# Patient Record
Sex: MALE | Race: WHITE | NOT HISPANIC OR LATINO | Employment: OTHER | URBAN - METROPOLITAN AREA
[De-identification: names, ages, dates, MRNs, and addresses within clinical notes are randomized per-mention and may not be internally consistent; named-entity substitution may affect disease eponyms.]

---

## 2023-01-01 ENCOUNTER — TELEPHONE (OUTPATIENT)
Dept: PRIMARY CARE | Facility: CLINIC | Age: 73
End: 2023-01-01
Payer: MEDICARE

## 2023-01-01 ENCOUNTER — PATIENT OUTREACH (OUTPATIENT)
Dept: CARE COORDINATION | Facility: CLINIC | Age: 73
End: 2023-01-01
Payer: MEDICARE

## 2023-01-01 ENCOUNTER — APPOINTMENT (OUTPATIENT)
Dept: RADIOLOGY | Facility: HOSPITAL | Age: 73
End: 2023-01-01
Payer: MEDICARE

## 2023-01-01 ENCOUNTER — HOSPITAL ENCOUNTER (EMERGENCY)
Facility: HOSPITAL | Age: 73
Discharge: HOME | End: 2023-10-31
Attending: STUDENT IN AN ORGANIZED HEALTH CARE EDUCATION/TRAINING PROGRAM
Payer: MEDICARE

## 2023-01-01 ENCOUNTER — TELEPHONE (OUTPATIENT)
Dept: PRIMARY CARE | Facility: CLINIC | Age: 73
End: 2023-01-01

## 2023-01-01 ENCOUNTER — HOSPITAL ENCOUNTER (EMERGENCY)
Facility: HOSPITAL | Age: 73
End: 2023-11-01
Attending: STUDENT IN AN ORGANIZED HEALTH CARE EDUCATION/TRAINING PROGRAM
Payer: MEDICARE

## 2023-01-01 ENCOUNTER — HOSPITAL ENCOUNTER (EMERGENCY)
Facility: HOSPITAL | Age: 73
Discharge: HOME | End: 2023-10-06
Attending: STUDENT IN AN ORGANIZED HEALTH CARE EDUCATION/TRAINING PROGRAM
Payer: MEDICARE

## 2023-01-01 VITALS
HEIGHT: 72 IN | HEART RATE: 117 BPM | TEMPERATURE: 97.2 F | WEIGHT: 315 LBS | OXYGEN SATURATION: 95 % | DIASTOLIC BLOOD PRESSURE: 67 MMHG | SYSTOLIC BLOOD PRESSURE: 154 MMHG | BODY MASS INDEX: 42.66 KG/M2 | RESPIRATION RATE: 24 BRPM

## 2023-01-01 VITALS
HEART RATE: 73 BPM | TEMPERATURE: 96.8 F | WEIGHT: 315 LBS | RESPIRATION RATE: 18 BRPM | DIASTOLIC BLOOD PRESSURE: 59 MMHG | SYSTOLIC BLOOD PRESSURE: 117 MMHG | HEIGHT: 77 IN | BODY MASS INDEX: 37.19 KG/M2 | OXYGEN SATURATION: 98 %

## 2023-01-01 VITALS
WEIGHT: 315 LBS | HEIGHT: 72 IN | BODY MASS INDEX: 42.66 KG/M2 | SYSTOLIC BLOOD PRESSURE: 109 MMHG | DIASTOLIC BLOOD PRESSURE: 80 MMHG | HEART RATE: 119 BPM

## 2023-01-01 DIAGNOSIS — I10 HYPERTENSION, BENIGN: ICD-10-CM

## 2023-01-01 DIAGNOSIS — E13.9 CONTROLLED SECONDARY DIABETES MELLITUS (MULTI): Primary | ICD-10-CM

## 2023-01-01 DIAGNOSIS — B35.6 TINEA CRURIS: Primary | ICD-10-CM

## 2023-01-01 DIAGNOSIS — N30.00 ACUTE CYSTITIS WITHOUT HEMATURIA: Primary | ICD-10-CM

## 2023-01-01 DIAGNOSIS — B96.89 ACUTE BACTERIAL SINUSITIS: Primary | ICD-10-CM

## 2023-01-01 DIAGNOSIS — E03.9 HYPOTHYROIDISM, UNSPECIFIED TYPE: Primary | ICD-10-CM

## 2023-01-01 DIAGNOSIS — B35.6 TINEA CRURIS: ICD-10-CM

## 2023-01-01 DIAGNOSIS — K30 DELAYED GASTRIC TRANSIT: Primary | ICD-10-CM

## 2023-01-01 DIAGNOSIS — I10 HYPERTENSION, BENIGN: Primary | ICD-10-CM

## 2023-01-01 DIAGNOSIS — N39.0 ACUTE UTI: Primary | ICD-10-CM

## 2023-01-01 DIAGNOSIS — I46.9: Primary | ICD-10-CM

## 2023-01-01 DIAGNOSIS — J01.90 ACUTE BACTERIAL SINUSITIS: Primary | ICD-10-CM

## 2023-01-01 LAB
ABO GROUP (TYPE) IN BLOOD: NORMAL
ALANINE AMINOTRANSFERASE (SGPT) (U/L) IN SER/PLAS: 17 U/L (ref 10–52)
ALANINE AMINOTRANSFERASE (SGPT) (U/L) IN SER/PLAS: NORMAL
ALBUMIN (G/DL) IN SER/PLAS: 3.3 G/DL (ref 3.4–5)
ALBUMIN (G/DL) IN SER/PLAS: NORMAL
ALBUMIN (MG/L) IN URINE: <7 MG/L
ALBUMIN (MG/L) IN URINE: NORMAL
ALBUMIN SERPL BCP-MCNC: 3.5 G/DL (ref 3.4–5)
ALBUMIN SERPL BCP-MCNC: 3.6 G/DL (ref 3.4–5)
ALBUMIN/CREATININE (UG/MG) IN URINE: NORMAL
ALBUMIN/CREATININE (UG/MG) IN URINE: NORMAL UG/MG CRT (ref 0–30)
ALKALINE PHOSPHATASE (U/L) IN SER/PLAS: 53 U/L (ref 33–136)
ALKALINE PHOSPHATASE (U/L) IN SER/PLAS: NORMAL
ALP SERPL-CCNC: 50 U/L (ref 33–136)
ALP SERPL-CCNC: 64 U/L (ref 33–136)
ALT SERPL W P-5'-P-CCNC: 14 U/L (ref 10–52)
ALT SERPL W P-5'-P-CCNC: 17 U/L (ref 10–52)
ANION GAP IN SER/PLAS: 8 MMOL/L (ref 10–20)
ANION GAP IN SER/PLAS: NORMAL
ANION GAP SERPL CALC-SCNC: 18 MMOL/L (ref 10–20)
ANION GAP SERPL CALC-SCNC: 9 MMOL/L (ref 10–20)
ANTIBODY SCREEN: NORMAL
APPEARANCE UR: ABNORMAL
ASPARTATE AMINOTRANSFERASE (SGOT) (U/L) IN SER/PLAS: 20 U/L (ref 9–39)
ASPARTATE AMINOTRANSFERASE (SGOT) (U/L) IN SER/PLAS: NORMAL
AST SERPL W P-5'-P-CCNC: 15 U/L (ref 9–39)
AST SERPL W P-5'-P-CCNC: 24 U/L (ref 9–39)
BACTERIA #/AREA URNS AUTO: ABNORMAL /HPF
BASOPHILS # BLD AUTO: 0.02 X10*3/UL (ref 0–0.1)
BASOPHILS # BLD AUTO: 0.04 X10*3/UL (ref 0–0.1)
BASOPHILS NFR BLD AUTO: 0.2 %
BASOPHILS NFR BLD AUTO: 0.5 %
BILIRUB SERPL-MCNC: 0.4 MG/DL (ref 0–1.2)
BILIRUB SERPL-MCNC: 0.5 MG/DL (ref 0–1.2)
BILIRUB UR STRIP.AUTO-MCNC: NEGATIVE MG/DL
BILIRUBIN TOTAL (MG/DL) IN SER/PLAS: 0.3 MG/DL (ref 0–1.2)
BILIRUBIN TOTAL (MG/DL) IN SER/PLAS: NORMAL
BUN SERPL-MCNC: 27 MG/DL (ref 6–23)
BUN SERPL-MCNC: 27 MG/DL (ref 6–23)
CALCIDIOL (25 OH VITAMIN D3) (NG/ML) IN SER/PLAS: 41 NG/ML
CALCIDIOL (25 OH VITAMIN D3) (NG/ML) IN SER/PLAS: NORMAL
CALCIUM (MG/DL) IN SER/PLAS: 9 MG/DL (ref 8.6–10.3)
CALCIUM (MG/DL) IN SER/PLAS: NORMAL
CALCIUM SERPL-MCNC: 9.2 MG/DL (ref 8.6–10.3)
CALCIUM SERPL-MCNC: 9.4 MG/DL (ref 8.6–10.3)
CARBON DIOXIDE, TOTAL (MMOL/L) IN SER/PLAS: 35 MMOL/L (ref 21–32)
CARBON DIOXIDE, TOTAL (MMOL/L) IN SER/PLAS: NORMAL
CHLORIDE (MMOL/L) IN SER/PLAS: 101 MMOL/L (ref 98–107)
CHLORIDE (MMOL/L) IN SER/PLAS: NORMAL
CHLORIDE SERPL-SCNC: 101 MMOL/L (ref 98–107)
CHLORIDE SERPL-SCNC: 96 MMOL/L (ref 98–107)
CHOLESTEROL (MG/DL) IN SER/PLAS: 112 MG/DL (ref 0–199)
CHOLESTEROL (MG/DL) IN SER/PLAS: NORMAL
CHOLESTEROL IN HDL (MG/DL) IN SER/PLAS: 29.5 MG/DL
CHOLESTEROL IN HDL (MG/DL) IN SER/PLAS: NORMAL
CHOLESTEROL IN LDL (MG/DL) IN SER/PLAS BY DIRECT ASSAY: 65 MG/DL (ref 0–129)
CHOLESTEROL/HDL RATIO: 3.8
CHOLESTEROL/HDL RATIO: NORMAL
CO2 SERPL-SCNC: 30 MMOL/L (ref 21–32)
CO2 SERPL-SCNC: 35 MMOL/L (ref 21–32)
COLOR UR: ABNORMAL
CREAT SERPL-MCNC: 0.98 MG/DL (ref 0.5–1.3)
CREAT SERPL-MCNC: 1 MG/DL (ref 0.5–1.3)
CREATININE (MG/DL) IN SER/PLAS: 0.93 MG/DL (ref 0.5–1.3)
CREATININE (MG/DL) IN SER/PLAS: NORMAL
CREATININE (MG/DL) IN URINE: 91.1 MG/DL (ref 20–370)
CREATININE (MG/DL) IN URINE: NORMAL
EOSINOPHIL # BLD AUTO: 0 X10*3/UL (ref 0–0.4)
EOSINOPHIL # BLD AUTO: 0.14 X10*3/UL (ref 0–0.4)
EOSINOPHIL NFR BLD AUTO: 0 %
EOSINOPHIL NFR BLD AUTO: 1.9 %
ERYTHROCYTE [DISTWIDTH] IN BLOOD BY AUTOMATED COUNT: 13.9 % (ref 11.5–14.5)
ERYTHROCYTE [DISTWIDTH] IN BLOOD BY AUTOMATED COUNT: 14.6 % (ref 11.5–14.5)
ESTIMATED AVERAGE GLUCOSE FOR HBA1C: 148 MG/DL
ESTIMATED AVERAGE GLUCOSE FOR HBA1C: NORMAL
GFR FEMALE: NORMAL
GFR MALE: 88 ML/MIN/1.73M2
GFR MALE: NORMAL
GFR SERPL CREATININE-BSD FRML MDRD: 80 ML/MIN/1.73M*2
GFR SERPL CREATININE-BSD FRML MDRD: 82 ML/MIN/1.73M*2
GLUCOSE (MG/DL) IN SER/PLAS: 162 MG/DL (ref 74–99)
GLUCOSE (MG/DL) IN SER/PLAS: NORMAL
GLUCOSE BLD MANUAL STRIP-MCNC: 326 MG/DL (ref 74–99)
GLUCOSE SERPL-MCNC: 353 MG/DL (ref 74–99)
GLUCOSE SERPL-MCNC: 78 MG/DL (ref 74–99)
GLUCOSE UR STRIP.AUTO-MCNC: NEGATIVE MG/DL
HCT VFR BLD AUTO: 40.7 % (ref 41–52)
HCT VFR BLD AUTO: 42.7 % (ref 41–52)
HEMOGLOBIN A1C/HEMOGLOBIN TOTAL IN BLOOD: 6.8 %
HEMOGLOBIN A1C/HEMOGLOBIN TOTAL IN BLOOD: NORMAL
HGB A1C-DATA CONVERSION: NORMAL %
HGB BLD-MCNC: 12.4 G/DL (ref 13.5–17.5)
HGB BLD-MCNC: 13 G/DL (ref 13.5–17.5)
IMM GRANULOCYTES # BLD AUTO: 0.04 X10*3/UL (ref 0–0.5)
IMM GRANULOCYTES # BLD AUTO: 0.07 X10*3/UL (ref 0–0.5)
IMM GRANULOCYTES NFR BLD AUTO: 0.4 % (ref 0–0.9)
IMM GRANULOCYTES NFR BLD AUTO: 0.9 % (ref 0–0.9)
INR PPP: 1.4 (ref 0.9–1.1)
KETONES UR STRIP.AUTO-MCNC: NEGATIVE MG/DL
LDL: 53 MG/DL (ref 0–99)
LDL: NORMAL
LEUKOCYTE ESTERASE UR QL STRIP.AUTO: ABNORMAL
LYMPHOCYTES # BLD AUTO: 0.53 X10*3/UL (ref 0.8–3)
LYMPHOCYTES # BLD AUTO: 1.69 X10*3/UL (ref 0.8–3)
LYMPHOCYTES NFR BLD AUTO: 22.5 %
LYMPHOCYTES NFR BLD AUTO: 5.4 %
MCH RBC QN AUTO: 26.8 PG (ref 26–34)
MCH RBC QN AUTO: 27 PG (ref 26–34)
MCHC RBC AUTO-ENTMCNC: 30.4 G/DL (ref 32–36)
MCHC RBC AUTO-ENTMCNC: 30.5 G/DL (ref 32–36)
MCV RBC AUTO: 88 FL (ref 80–100)
MCV RBC AUTO: 89 FL (ref 80–100)
MONOCYTES # BLD AUTO: 0.43 X10*3/UL (ref 0.05–0.8)
MONOCYTES # BLD AUTO: 0.57 X10*3/UL (ref 0.05–0.8)
MONOCYTES NFR BLD AUTO: 5.7 %
MONOCYTES NFR BLD AUTO: 5.8 %
MUCOUS THREADS #/AREA URNS AUTO: ABNORMAL /LPF
NEUTROPHILS # BLD AUTO: 5.14 X10*3/UL (ref 1.6–5.5)
NEUTROPHILS # BLD AUTO: 8.59 X10*3/UL (ref 1.6–5.5)
NEUTROPHILS NFR BLD AUTO: 68.5 %
NEUTROPHILS NFR BLD AUTO: 88.2 %
NITRITE UR QL STRIP.AUTO: POSITIVE
NON HDL CHOLESTEROL: NORMAL
NRBC BLD-RTO: 0 /100 WBCS (ref 0–0)
NRBC BLD-RTO: 0 /100 WBCS (ref 0–0)
PH UR STRIP.AUTO: 5 [PH]
PLATELET # BLD AUTO: 214 X10*3/UL (ref 150–450)
PLATELET # BLD AUTO: 257 X10*3/UL (ref 150–450)
PMV BLD AUTO: 10.1 FL (ref 7.5–11.5)
PMV BLD AUTO: 9.9 FL (ref 7.5–11.5)
POTASSIUM (MMOL/L) IN SER/PLAS: 3.9 MMOL/L (ref 3.5–5.3)
POTASSIUM (MMOL/L) IN SER/PLAS: NORMAL
POTASSIUM SERPL-SCNC: 4.3 MMOL/L (ref 3.5–5.3)
POTASSIUM SERPL-SCNC: 4.6 MMOL/L (ref 3.5–5.3)
PROT SERPL-MCNC: 6.1 G/DL (ref 6.4–8.2)
PROT SERPL-MCNC: 6.4 G/DL (ref 6.4–8.2)
PROT UR STRIP.AUTO-MCNC: NEGATIVE MG/DL
PROTEIN TOTAL: 5.7 G/DL (ref 6.4–8.2)
PROTEIN TOTAL: NORMAL
PROTHROMBIN TIME: 15.3 SECONDS (ref 9.8–12.8)
RBC # BLD AUTO: 4.62 X10*6/UL (ref 4.5–5.9)
RBC # BLD AUTO: 4.81 X10*6/UL (ref 4.5–5.9)
RBC # UR STRIP.AUTO: NEGATIVE /UL
RBC #/AREA URNS AUTO: ABNORMAL /HPF
RH FACTOR (ANTIGEN D): NORMAL
SODIUM (MMOL/L) IN SER/PLAS: 140 MMOL/L (ref 136–145)
SODIUM (MMOL/L) IN SER/PLAS: NORMAL
SODIUM SERPL-SCNC: 139 MMOL/L (ref 136–145)
SODIUM SERPL-SCNC: 141 MMOL/L (ref 136–145)
SP GR UR STRIP.AUTO: 1.02
T4,FREE BY EQUILIBRIUM DIALYSIS: NORMAL
THYROTROPIN (MIU/L) IN SER/PLAS BY DETECTION LIMIT <= 0.05 MIU/L: 4.29 MIU/L (ref 0.44–3.98)
THYROTROPIN (MIU/L) IN SER/PLAS BY DETECTION LIMIT <= 0.05 MIU/L: NORMAL
THYROXINE (T4) FREE (NG/DL) IN SER/PLAS: 0.79 NG/DL (ref 0.61–1.12)
TRIGLYCERIDE (MG/DL) IN SER/PLAS: 148 MG/DL (ref 0–149)
TRIGLYCERIDE (MG/DL) IN SER/PLAS: NORMAL
TRIIODOTHYRONINE (T3) FREE (PG/ML) IN SER/PLAS: 2.8 PG/ML (ref 2.3–4.2)
TRIIODOTHYRONINE (T3) FREE (PG/ML) IN SER/PLAS: NORMAL
UREA NITROGEN (MG/DL) IN SER/PLAS: 23 MG/DL (ref 6–23)
UREA NITROGEN (MG/DL) IN SER/PLAS: NORMAL
UROBILINOGEN UR STRIP.AUTO-MCNC: 4 MG/DL
VLDL: 30 MG/DL (ref 0–40)
VLDL: NORMAL
WBC # BLD AUTO: 7.5 X10*3/UL (ref 4.4–11.3)
WBC # BLD AUTO: 9.8 X10*3/UL (ref 4.4–11.3)
WBC #/AREA URNS AUTO: >50 /HPF
WBC CLUMPS #/AREA URNS AUTO: ABNORMAL /HPF

## 2023-01-01 PROCEDURE — 2500000005 HC RX 250 GENERAL PHARMACY W/O HCPCS: Performed by: STUDENT IN AN ORGANIZED HEALTH CARE EDUCATION/TRAINING PROGRAM

## 2023-01-01 PROCEDURE — 82947 ASSAY GLUCOSE BLOOD QUANT: CPT

## 2023-01-01 PROCEDURE — 99285 EMERGENCY DEPT VISIT HI MDM: CPT | Mod: 25 | Performed by: STUDENT IN AN ORGANIZED HEALTH CARE EDUCATION/TRAINING PROGRAM

## 2023-01-01 PROCEDURE — 96375 TX/PRO/DX INJ NEW DRUG ADDON: CPT

## 2023-01-01 PROCEDURE — 2500000005 HC RX 250 GENERAL PHARMACY W/O HCPCS: Performed by: EMERGENCY MEDICINE

## 2023-01-01 PROCEDURE — 85610 PROTHROMBIN TIME: CPT

## 2023-01-01 PROCEDURE — 85025 COMPLETE CBC W/AUTO DIFF WBC: CPT

## 2023-01-01 PROCEDURE — 86901 BLOOD TYPING SEROLOGIC RH(D): CPT

## 2023-01-01 PROCEDURE — C9113 INJ PANTOPRAZOLE SODIUM, VIA: HCPCS

## 2023-01-01 PROCEDURE — 74177 CT ABD & PELVIS W/CONTRAST: CPT | Performed by: RADIOLOGY

## 2023-01-01 PROCEDURE — 86850 RBC ANTIBODY SCREEN: CPT

## 2023-01-01 PROCEDURE — 2500000004 HC RX 250 GENERAL PHARMACY W/ HCPCS (ALT 636 FOR OP/ED): Performed by: STUDENT IN AN ORGANIZED HEALTH CARE EDUCATION/TRAINING PROGRAM

## 2023-01-01 PROCEDURE — 85025 COMPLETE CBC W/AUTO DIFF WBC: CPT | Performed by: PHYSICIAN ASSISTANT

## 2023-01-01 PROCEDURE — 96361 HYDRATE IV INFUSION ADD-ON: CPT

## 2023-01-01 PROCEDURE — 2500000001 HC RX 250 WO HCPCS SELF ADMINISTERED DRUGS (ALT 637 FOR MEDICARE OP): Performed by: EMERGENCY MEDICINE

## 2023-01-01 PROCEDURE — 84075 ASSAY ALKALINE PHOSPHATASE: CPT

## 2023-01-01 PROCEDURE — 99285 EMERGENCY DEPT VISIT HI MDM: CPT | Performed by: STUDENT IN AN ORGANIZED HEALTH CARE EDUCATION/TRAINING PROGRAM

## 2023-01-01 PROCEDURE — 2550000001 HC RX 255 CONTRASTS: Performed by: STUDENT IN AN ORGANIZED HEALTH CARE EDUCATION/TRAINING PROGRAM

## 2023-01-01 PROCEDURE — 86900 BLOOD TYPING SEROLOGIC ABO: CPT

## 2023-01-01 PROCEDURE — 80053 COMPREHEN METABOLIC PANEL: CPT

## 2023-01-01 PROCEDURE — 36415 COLL VENOUS BLD VENIPUNCTURE: CPT

## 2023-01-01 PROCEDURE — 2500000004 HC RX 250 GENERAL PHARMACY W/ HCPCS (ALT 636 FOR OP/ED)

## 2023-01-01 PROCEDURE — 81001 URINALYSIS AUTO W/SCOPE: CPT | Performed by: PHYSICIAN ASSISTANT

## 2023-01-01 PROCEDURE — 96376 TX/PRO/DX INJ SAME DRUG ADON: CPT

## 2023-01-01 PROCEDURE — 99283 EMERGENCY DEPT VISIT LOW MDM: CPT

## 2023-01-01 PROCEDURE — 36415 COLL VENOUS BLD VENIPUNCTURE: CPT | Performed by: PHYSICIAN ASSISTANT

## 2023-01-01 PROCEDURE — 2500000001 HC RX 250 WO HCPCS SELF ADMINISTERED DRUGS (ALT 637 FOR MEDICARE OP): Performed by: PHYSICIAN ASSISTANT

## 2023-01-01 PROCEDURE — 74177 CT ABD & PELVIS W/CONTRAST: CPT

## 2023-01-01 PROCEDURE — 80053 COMPREHEN METABOLIC PANEL: CPT | Performed by: PHYSICIAN ASSISTANT

## 2023-01-01 PROCEDURE — 96374 THER/PROPH/DIAG INJ IV PUSH: CPT

## 2023-01-01 RX ORDER — FUROSEMIDE 40 MG/1
TABLET ORAL
Qty: 60 TABLET | Refills: 0 | Status: SHIPPED | OUTPATIENT
Start: 2023-01-01 | End: 2023-01-01

## 2023-01-01 RX ORDER — ATORVASTATIN CALCIUM 80 MG/1
80 TABLET, FILM COATED ORAL DAILY
COMMUNITY
Start: 2018-05-24

## 2023-01-01 RX ORDER — METOPROLOL TARTRATE 1 MG/ML
5 INJECTION, SOLUTION INTRAVENOUS ONCE
Status: COMPLETED | OUTPATIENT
Start: 2023-01-01 | End: 2023-01-01

## 2023-01-01 RX ORDER — ERGOCALCIFEROL 1.25 MG/1
50000 CAPSULE ORAL
COMMUNITY
Start: 2017-12-17

## 2023-01-01 RX ORDER — DULOXETIN HYDROCHLORIDE 60 MG/1
60 CAPSULE, DELAYED RELEASE ORAL 2 TIMES DAILY
COMMUNITY
Start: 2016-12-05

## 2023-01-01 RX ORDER — LEVOFLOXACIN 250 MG/1
750 TABLET ORAL DAILY
Qty: 21 TABLET | Refills: 0 | Status: SHIPPED | OUTPATIENT
Start: 2023-01-01 | End: 2023-01-01

## 2023-01-01 RX ORDER — FUROSEMIDE 40 MG/1
40 TABLET ORAL DAILY
Qty: 90 TABLET | Refills: 3 | Status: SHIPPED | OUTPATIENT
Start: 2023-01-01 | End: 2023-01-01

## 2023-01-01 RX ORDER — ONDANSETRON HYDROCHLORIDE 2 MG/ML
4 INJECTION, SOLUTION INTRAVENOUS ONCE
Status: COMPLETED | OUTPATIENT
Start: 2023-01-01 | End: 2023-01-01

## 2023-01-01 RX ORDER — FUROSEMIDE 40 MG/1
40 TABLET ORAL 2 TIMES DAILY
Qty: 60 TABLET | Refills: 0 | Status: SHIPPED | OUTPATIENT
Start: 2023-01-01 | End: 2023-01-01

## 2023-01-01 RX ORDER — FUROSEMIDE 40 MG/1
TABLET ORAL
Qty: 180 TABLET | Refills: 1 | Status: SHIPPED | OUTPATIENT
Start: 2023-01-01 | End: 2023-01-01 | Stop reason: CLARIF

## 2023-01-01 RX ORDER — INSULIN HUMAN 500 [IU]/ML
200 INJECTION, SOLUTION SUBCUTANEOUS EVERY MORNING
COMMUNITY
Start: 2023-01-01

## 2023-01-01 RX ORDER — AZITHROMYCIN 250 MG/1
TABLET, FILM COATED ORAL
Qty: 6 TABLET | Refills: 0 | Status: SHIPPED | OUTPATIENT
Start: 2023-01-01 | End: 2023-01-01

## 2023-01-01 RX ORDER — NYSTATIN 100000 [USP'U]/G
POWDER TOPICAL
Qty: 60 G | Refills: 1 | Status: SHIPPED | OUTPATIENT
Start: 2023-01-01 | End: 2023-01-01 | Stop reason: CLARIF

## 2023-01-01 RX ORDER — BLOOD SUGAR DIAGNOSTIC
STRIP MISCELLANEOUS
COMMUNITY
Start: 2017-02-13 | End: 2023-01-01 | Stop reason: SDUPTHER

## 2023-01-01 RX ORDER — METOPROLOL TARTRATE 1 MG/ML
5 INJECTION, SOLUTION INTRAVENOUS ONCE
Status: DISCONTINUED | OUTPATIENT
Start: 2023-01-01 | End: 2023-01-01

## 2023-01-01 RX ORDER — LEVOTHYROXINE SODIUM 125 UG/1
125 TABLET ORAL
Qty: 90 TABLET | Refills: 3 | Status: SHIPPED | OUTPATIENT
Start: 2023-01-01 | End: 2023-01-01 | Stop reason: CLARIF

## 2023-01-01 RX ORDER — LEVOFLOXACIN 750 MG/1
750 TABLET ORAL ONCE
Status: COMPLETED | OUTPATIENT
Start: 2023-01-01 | End: 2023-01-01

## 2023-01-01 RX ORDER — METOPROLOL TARTRATE 100 MG/1
100 TABLET ORAL 2 TIMES DAILY
COMMUNITY
Start: 2019-11-20

## 2023-01-01 RX ORDER — POLYETHYLENE GLYCOL 3350 17 G/17G
17 POWDER, FOR SOLUTION ORAL
COMMUNITY
Start: 2023-01-01 | End: 2023-01-01 | Stop reason: ENTERED-IN-ERROR

## 2023-01-01 RX ORDER — CIPROFLOXACIN 250 MG/1
1 TABLET, FILM COATED ORAL EVERY 12 HOURS
COMMUNITY
Start: 2022-01-15 | End: 2023-01-01 | Stop reason: ENTERED-IN-ERROR

## 2023-01-01 RX ORDER — FLUCONAZOLE 100 MG/1
100 TABLET ORAL DAILY
Qty: 14 TABLET | Refills: 0 | Status: SHIPPED | OUTPATIENT
Start: 2023-01-01 | End: 2023-01-01

## 2023-01-01 RX ORDER — LEVOTHYROXINE SODIUM 125 UG/1
TABLET ORAL
COMMUNITY
End: 2023-01-01 | Stop reason: SDUPTHER

## 2023-01-01 RX ORDER — AMMONIUM LACTATE 12 G/100G
1 CREAM TOPICAL AS NEEDED
COMMUNITY
Start: 2022-04-25

## 2023-01-01 RX ORDER — METOCLOPRAMIDE HYDROCHLORIDE 5 MG/ML
10 INJECTION INTRAMUSCULAR; INTRAVENOUS ONCE
Status: COMPLETED | OUTPATIENT
Start: 2023-01-01 | End: 2023-01-01

## 2023-01-01 RX ORDER — DOCUSATE SODIUM 100 MG/1
CAPSULE, LIQUID FILLED ORAL 2 TIMES DAILY
COMMUNITY
Start: 2023-01-01 | End: 2023-01-01 | Stop reason: ENTERED-IN-ERROR

## 2023-01-01 RX ORDER — ALLOPURINOL 100 MG/1
200 TABLET ORAL DAILY
COMMUNITY
Start: 2020-06-13

## 2023-01-01 RX ORDER — BLOOD-GLUCOSE METER
EACH MISCELLANEOUS
COMMUNITY
Start: 2023-01-01 | End: 2023-01-01 | Stop reason: ENTERED-IN-ERROR

## 2023-01-01 RX ORDER — INSULIN HUMAN 500 [IU]/ML
50 INJECTION, SOLUTION SUBCUTANEOUS NIGHTLY
COMMUNITY

## 2023-01-01 RX ORDER — INSULIN LISPRO 100 [IU]/ML
INJECTION, SOLUTION INTRAVENOUS; SUBCUTANEOUS
COMMUNITY
Start: 2022-04-28 | End: 2023-01-01 | Stop reason: ENTERED-IN-ERROR

## 2023-01-01 RX ORDER — SYRINGE,SAFETY WITH NEEDLE,1ML 25GX1"
SYRINGE (EA) MISCELLANEOUS
COMMUNITY
Start: 2017-06-20 | End: 2023-01-01 | Stop reason: ENTERED-IN-ERROR

## 2023-01-01 RX ORDER — FENOFIBRATE 160 MG/1
1 TABLET ORAL DAILY
COMMUNITY
Start: 2023-01-01

## 2023-01-01 RX ORDER — CIPROFLOXACIN 500 MG/1
500 TABLET ORAL 2 TIMES DAILY
Qty: 20 TABLET | Refills: 0 | Status: SHIPPED | OUTPATIENT
Start: 2023-01-01 | End: 2023-01-01

## 2023-01-01 RX ORDER — MV/FA/DHA/EPA/FISH OIL/SAW/GNK 400MCG-200
500 COMBINATION PACKAGE (EA) ORAL DAILY
Qty: 90 CAPSULE | Refills: 3 | Status: SHIPPED | OUTPATIENT
Start: 2023-01-01 | End: 2023-01-01 | Stop reason: ENTERED-IN-ERROR

## 2023-01-01 RX ORDER — PEN NEEDLE, DIABETIC 31 GX5/16"
NEEDLE, DISPOSABLE MISCELLANEOUS
COMMUNITY
Start: 2023-01-01 | End: 2023-01-01 | Stop reason: ENTERED-IN-ERROR

## 2023-01-01 RX ORDER — MV/FA/DHA/EPA/FISH OIL/SAW/GNK 400MCG-200
COMBINATION PACKAGE (EA) ORAL
COMMUNITY
End: 2023-01-01 | Stop reason: SDUPTHER

## 2023-01-01 RX ORDER — CLINDAMYCIN HYDROCHLORIDE 300 MG/1
CAPSULE ORAL
COMMUNITY
Start: 2023-01-01 | End: 2023-01-01 | Stop reason: ENTERED-IN-ERROR

## 2023-01-01 RX ORDER — LANCETS 33 GAUGE
EACH MISCELLANEOUS
COMMUNITY
Start: 2023-01-01 | End: 2023-01-01 | Stop reason: ENTERED-IN-ERROR

## 2023-01-01 RX ORDER — LISINOPRIL 5 MG/1
1 TABLET ORAL DAILY
COMMUNITY
Start: 2021-11-29 | End: 2023-01-01 | Stop reason: ENTERED-IN-ERROR

## 2023-01-01 RX ORDER — FUROSEMIDE 40 MG/1
40 TABLET ORAL 4 TIMES DAILY
Qty: 30 TABLET | Refills: 3 | Status: CANCELLED | OUTPATIENT
Start: 2023-01-01

## 2023-01-01 RX ORDER — PANTOPRAZOLE SODIUM 40 MG/10ML
80 INJECTION, POWDER, LYOPHILIZED, FOR SOLUTION INTRAVENOUS ONCE
Status: COMPLETED | OUTPATIENT
Start: 2023-01-01 | End: 2023-01-01

## 2023-01-01 RX ORDER — METOCLOPRAMIDE 5 MG/1
5 TABLET ORAL EVERY 8 HOURS PRN
Qty: 15 TABLET | Refills: 0 | Status: SHIPPED | OUTPATIENT
Start: 2023-01-01 | End: 2023-01-01 | Stop reason: CLARIF

## 2023-01-01 RX ORDER — LEVOTHYROXINE SODIUM 125 UG/1
TABLET ORAL
Qty: 90 TABLET | Refills: 3 | Status: SHIPPED | OUTPATIENT
Start: 2023-01-01 | End: 2023-01-01 | Stop reason: CLARIF

## 2023-01-01 RX ORDER — METOPROLOL TARTRATE 50 MG/1
TABLET ORAL 2 TIMES DAILY
COMMUNITY
Start: 2023-01-01 | End: 2023-01-01 | Stop reason: ENTERED-IN-ERROR

## 2023-01-01 RX ORDER — FUROSEMIDE 40 MG/1
TABLET ORAL 2 TIMES DAILY
COMMUNITY
End: 2023-01-01 | Stop reason: SDUPTHER

## 2023-01-01 RX ORDER — NYSTATIN 100000 [USP'U]/G
POWDER TOPICAL 2 TIMES DAILY
COMMUNITY
Start: 2022-04-07 | End: 2023-01-01

## 2023-01-01 RX ORDER — TAMSULOSIN HYDROCHLORIDE 0.4 MG/1
0.8 CAPSULE ORAL NIGHTLY
COMMUNITY
Start: 2022-09-22

## 2023-01-01 RX ORDER — FLUCONAZOLE 100 MG/1
1 TABLET ORAL DAILY
COMMUNITY
Start: 2022-01-24 | End: 2023-01-01 | Stop reason: SDUPTHER

## 2023-01-01 RX ORDER — OMEPRAZOLE 20 MG/1
CAPSULE, DELAYED RELEASE ORAL
COMMUNITY
End: 2023-01-01 | Stop reason: ENTERED-IN-ERROR

## 2023-01-01 RX ORDER — METOPROLOL TARTRATE 50 MG/1
100 TABLET ORAL 2 TIMES DAILY
Status: DISCONTINUED | OUTPATIENT
Start: 2023-01-01 | End: 2023-01-01 | Stop reason: HOSPADM

## 2023-01-01 RX ORDER — PANTOPRAZOLE SODIUM 40 MG/1
40 TABLET, DELAYED RELEASE ORAL
Qty: 30 TABLET | Refills: 0 | Status: SHIPPED | OUTPATIENT
Start: 2023-01-01 | End: 2023-01-01 | Stop reason: CLARIF

## 2023-01-01 RX ORDER — BLOOD SUGAR DIAGNOSTIC
STRIP MISCELLANEOUS
Qty: 100 STRIP | Refills: 0 | Status: SHIPPED | OUTPATIENT
Start: 2023-01-01 | End: 2023-01-01 | Stop reason: CLARIF

## 2023-01-01 RX ORDER — DOXAZOSIN 4 MG/1
4 TABLET ORAL NIGHTLY
COMMUNITY
Start: 2021-10-11

## 2023-01-01 RX ORDER — POTASSIUM CHLORIDE 1500 MG/1
1 TABLET, EXTENDED RELEASE ORAL DAILY
COMMUNITY
End: 2023-01-01 | Stop reason: ENTERED-IN-ERROR

## 2023-01-01 RX ORDER — SPIRONOLACTONE 25 MG/1
1 TABLET ORAL DAILY
COMMUNITY

## 2023-01-01 RX ADMIN — IOHEXOL 80 ML: 350 INJECTION, SOLUTION INTRAVENOUS at 13:26

## 2023-01-01 RX ADMIN — ONDANSETRON 4 MG: 2 INJECTION INTRAMUSCULAR; INTRAVENOUS at 10:34

## 2023-01-01 RX ADMIN — METOCLOPRAMIDE 10 MG: 5 INJECTION, SOLUTION INTRAMUSCULAR; INTRAVENOUS at 16:26

## 2023-01-01 RX ADMIN — METOPROLOL TARTRATE 5 MG: 5 INJECTION INTRAVENOUS at 06:16

## 2023-01-01 RX ADMIN — SODIUM CHLORIDE 1000 ML: 9 INJECTION, SOLUTION INTRAVENOUS at 10:34

## 2023-01-01 RX ADMIN — METOPROLOL TARTRATE 5 MG: 5 INJECTION INTRAVENOUS at 21:24

## 2023-01-01 RX ADMIN — METOPROLOL TARTRATE 5 MG: 5 INJECTION INTRAVENOUS at 14:27

## 2023-01-01 RX ADMIN — PANTOPRAZOLE SODIUM 80 MG: 40 INJECTION, POWDER, FOR SOLUTION INTRAVENOUS at 10:33

## 2023-01-01 RX ADMIN — LEVOFLOXACIN 750 MG: 750 TABLET, FILM COATED ORAL at 18:44

## 2023-01-01 RX ADMIN — METOPROLOL TARTRATE 100 MG: 50 TABLET, FILM COATED ORAL at 19:31

## 2023-01-01 RX ADMIN — METOPROLOL TARTRATE 100 MG: 50 TABLET, FILM COATED ORAL at 08:11

## 2023-01-01 RX ADMIN — METOPROLOL TARTRATE 5 MG: 5 INJECTION INTRAVENOUS at 12:01

## 2023-01-01 ASSESSMENT — COLUMBIA-SUICIDE SEVERITY RATING SCALE - C-SSRS
1. IN THE PAST MONTH, HAVE YOU WISHED YOU WERE DEAD OR WISHED YOU COULD GO TO SLEEP AND NOT WAKE UP?: NO
1. IN THE PAST MONTH, HAVE YOU WISHED YOU WERE DEAD OR WISHED YOU COULD GO TO SLEEP AND NOT WAKE UP?: NO
6. HAVE YOU EVER DONE ANYTHING, STARTED TO DO ANYTHING, OR PREPARED TO DO ANYTHING TO END YOUR LIFE?: NO
2. HAVE YOU ACTUALLY HAD ANY THOUGHTS OF KILLING YOURSELF?: NO
6. HAVE YOU EVER DONE ANYTHING, STARTED TO DO ANYTHING, OR PREPARED TO DO ANYTHING TO END YOUR LIFE?: NO
2. HAVE YOU ACTUALLY HAD ANY THOUGHTS OF KILLING YOURSELF?: NO

## 2023-01-01 ASSESSMENT — LIFESTYLE VARIABLES
EVER FELT BAD OR GUILTY ABOUT YOUR DRINKING: NO
EVER HAD A DRINK FIRST THING IN THE MORNING TO STEADY YOUR NERVES TO GET RID OF A HANGOVER: NO
HAVE PEOPLE ANNOYED YOU BY CRITICIZING YOUR DRINKING: NO
REASON UNABLE TO ASSESS: NO
HAVE YOU EVER FELT YOU SHOULD CUT DOWN ON YOUR DRINKING: NO

## 2023-01-01 ASSESSMENT — PAIN SCALES - GENERAL
PAINLEVEL_OUTOF10: 0 - NO PAIN

## 2023-01-01 ASSESSMENT — PAIN - FUNCTIONAL ASSESSMENT: PAIN_FUNCTIONAL_ASSESSMENT: 0-10

## 2023-03-22 PROBLEM — B35.6 TINEA CRURIS: Status: ACTIVE | Noted: 2023-01-01

## 2023-03-22 PROBLEM — I48.91 ATRIAL FIBRILLATION WITH RVR (MULTI): Status: ACTIVE | Noted: 2023-01-01

## 2023-03-22 PROBLEM — G47.33 OSA (OBSTRUCTIVE SLEEP APNEA): Status: ACTIVE | Noted: 2023-01-01

## 2023-03-22 PROBLEM — R60.9 PERIPHERAL EDEMA: Status: ACTIVE | Noted: 2023-01-01

## 2023-03-22 PROBLEM — E66.9 OBESITY: Status: ACTIVE | Noted: 2023-01-01

## 2023-03-22 PROBLEM — J01.90 ACUTE BACTERIAL SINUSITIS: Status: ACTIVE | Noted: 2023-01-01

## 2023-03-22 PROBLEM — N39.0 ACUTE UTI: Status: ACTIVE | Noted: 2023-01-01

## 2023-03-22 PROBLEM — E55.9 VITAMIN D DEFICIENCY: Status: ACTIVE | Noted: 2023-01-01

## 2023-03-22 PROBLEM — R06.02 SHORTNESS OF BREATH: Status: ACTIVE | Noted: 2023-01-01

## 2023-03-22 PROBLEM — Z99.81 REQUIRES OXYGEN THERAPY: Status: ACTIVE | Noted: 2023-01-01

## 2023-03-22 PROBLEM — H61.23 BILATERAL HEARING LOSS DUE TO CERUMEN IMPACTION: Status: ACTIVE | Noted: 2023-01-01

## 2023-03-22 PROBLEM — I25.10 ATHSCL HEART DISEASE OF NATIVE CORONARY ARTERY W/O ANG PCTRS: Status: ACTIVE | Noted: 2023-01-01

## 2023-03-22 PROBLEM — R26.2 UNABLE TO AMBULATE: Status: ACTIVE | Noted: 2023-01-01

## 2023-03-22 PROBLEM — B96.89 ACUTE BACTERIAL SINUSITIS: Status: ACTIVE | Noted: 2023-01-01

## 2023-03-22 PROBLEM — M70.62 TROCHANTERIC BURSITIS OF LEFT HIP: Status: ACTIVE | Noted: 2023-01-01

## 2023-03-22 PROBLEM — R00.0 TACHYCARDIA: Status: ACTIVE | Noted: 2023-01-01

## 2023-03-22 PROBLEM — I48.0 PAROXYSMAL ATRIAL FIBRILLATION (MULTI): Status: ACTIVE | Noted: 2023-01-01

## 2023-03-22 PROBLEM — E13.9: Status: ACTIVE | Noted: 2023-01-01

## 2023-03-22 PROBLEM — I10 HYPERTENSION, BENIGN: Status: ACTIVE | Noted: 2023-01-01

## 2023-03-22 PROBLEM — R60.9 EDEMA: Status: ACTIVE | Noted: 2023-01-01

## 2023-03-22 PROBLEM — R53.1 WEAKNESS: Status: ACTIVE | Noted: 2023-01-01

## 2023-03-22 PROBLEM — I47.10 PAROXYSMAL SVT (SUPRAVENTRICULAR TACHYCARDIA) (CMS-HCC): Status: ACTIVE | Noted: 2023-01-01

## 2023-03-22 PROBLEM — E78.5 HYPERLIPIDEMIA: Status: ACTIVE | Noted: 2023-01-01

## 2023-03-22 PROBLEM — N18.30: Status: ACTIVE | Noted: 2023-01-01

## 2023-03-22 PROBLEM — I89.0 CHRONIC ACQUIRED LYMPHEDEMA: Status: ACTIVE | Noted: 2023-01-01

## 2023-03-22 PROBLEM — M15.9 GENERALIZED OSTEOARTHRITIS OF MULTIPLE SITES: Status: ACTIVE | Noted: 2023-01-01

## 2023-03-22 PROBLEM — N40.0 BPH (BENIGN PROSTATIC HYPERPLASIA): Status: ACTIVE | Noted: 2023-01-01

## 2023-03-22 PROBLEM — F32.A DEPRESSION: Status: ACTIVE | Noted: 2023-01-01

## 2023-03-22 PROBLEM — H66.90 ACUTE OTITIS MEDIA: Status: ACTIVE | Noted: 2023-01-01

## 2023-03-22 PROBLEM — R63.2 POLYPHAGIA: Status: ACTIVE | Noted: 2023-01-01

## 2023-07-10 PROBLEM — A41.9 SEPSIS (MULTI): Status: ACTIVE | Noted: 2023-01-01

## 2023-07-10 PROBLEM — E16.2 HYPOGLYCEMIA: Status: ACTIVE | Noted: 2023-01-01

## 2023-07-10 PROBLEM — T68.XXXA HYPOTHERMIA: Status: ACTIVE | Noted: 2023-01-01

## 2023-10-06 NOTE — ED PROVIDER NOTES
EMERGENCY MEDICINE EVALUATION NOTE    History of Present Illness     Chief Complaint:   Chief Complaint   Patient presents with    Urinary Retention       HPI: Ignacio Hatch is a 73 y.o. male presents with a chief complaint of multiple medical complaints.  Patient was brought in by ambulance with wife with complaints of difficulty urinating and also a rash.  Wife has noticed that he has had some white leg bumps pop up over his hips feet and various parts of his body.  They stated [facility] when they requested that he come to the ED due to his other complaints.  Patient has not been able to urinate properly for about 2 days.  They state has been trying to urinate now for about 24 hours and only dribbles come out.  Patient is no history of any urinary retention has no issues with urination other than frequent UTIs in the past.  Patient denies any fevers or chills.  Patient denies any significant pain in his lower abdomen.  Patient reports that he does have a large amount of antibiotic and medication allergies.  He states that he cannot remember them off the top of his head.  Patient denies any current chest pain or shortness of breath.    Previous History     Past Medical History:   Diagnosis Date    Other hyperlipidemia     Other hyperlipidemia    Overweight     Overweight (BMI 25.0-29.9)    Personal history of other diseases of the nervous system and sense organs     History of sleep apnea    Personal history of other diseases of urinary system     History of renal insufficiency syndrome    Personal history of other endocrine, nutritional and metabolic disease     History of hyperlipidemia    Personal history of other endocrine, nutritional and metabolic disease     History of type 2 diabetes mellitus    Pure hyperglyceridemia     Hypertriglyceridemia    Unspecified osteoarthritis, unspecified site     Degenerative joint disease     Past Surgical History:   Procedure  "Laterality Date    APPENDECTOMY  01/22/2018    Appendectomy    OTHER SURGICAL HISTORY  10/19/2021    Cardiac catheterization    TONSILLECTOMY  01/22/2018    Tonsillectomy        No family history on file.  Allergies   Allergen Reactions    Azithromycin Unknown    Cephalosporins Unknown    Clarithromycin Unknown    Erythromycin Unknown    Hydrocodone-Acetaminophen Unknown    Hydrocortisone Unknown    Neomycin Unknown    Other Unknown    Penicillins Unknown    Sulfa (Sulfonamide Antibiotics) Unknown    Tetracycline Unknown     Current Outpatient Medications   Medication Instructions    allopurinol (Zyloprim) 100 mg tablet oral    ammonium lactate (Amlactin) 12 % cream Topical    apixaban (Eliquis) 5 mg tablet oral, 2 times daily    atorvastatin (Lipitor) 80 mg tablet oral    BD Ultra-Fine Short Pen Needle 31 gauge x 5/16\" needle INJECTS TWICE DAILY E11.9    blood sugar diagnostic (OneTouch Ultra Test) strip TEST 5 TIMES DAILY.    clindamycin (Cleocin) 300 mg capsule     docusate sodium (Colace) 100 mg capsule oral, 2 times daily    doxazosin (Cardura) 4 mg tablet oral    DULoxetine (Cymbalta) 60 mg DR capsule oral, 2 times daily    empagliflozin (Jardiance) 10 mg 1 tablet, oral, Daily    ergocalciferol (Vitamin D-2) 1.25 MG (34237 UT) capsule oral    fenofibrate (Triglide) 160 mg tablet 1 tablet, oral, Daily    furosemide (Lasix) 40 mg tablet TAKE 1 TABLET (40 MG) BY MOUTH IN THE MORNING AND AT BEDTIME    glucagon 3 mg/actuation spray,non-aerosol 1 Dose, nasal, Daily PRN    HumuLIN R U-500, Conc, Kwikpen 500 unit/mL (3 mL) CONCENTRATED injection 55 UNITS IN THE MORNING AND 25 UNITS IN THE EVENING    insulin regular (HumuLIN R U-500) 500 unit/mL CONCENTRATED injection subcutaneous    insulin regular (HumuLIN R,NovoLIN R) 100 unit/mL injection Give subcutaneous 4 times a day.Hypoglycemia protocol if blood glucose is between 0-70.Give 0 units if blood glucose is between 71-150Give 20 units if blood glucose is between " "151-200Give 25 units if blood glucose is between 201-250Give 30 units if blood glucose is between 251-300Give 35 units if blood glucose is between 301-350Give 40 units if blood glucose is between 351-400Notify physician if blood glucose is greater than 400    insulin syringe-needle U-100 31G X 5/16\" 1 mL syringe PT INJECT 6-7 TIMES DAILY    krill oil 500 mg, oral, Daily    levoFLOXacin (LEVAQUIN) 750 mg, oral, Daily    levothyroxine (Synthroid, Levoxyl) 125 mcg tablet     levothyroxine (SYNTHROID, LEVOXYL) 125 mcg, oral, Daily before breakfast    lisinopril 5 mg tablet 1 tablet, oral, Daily    metoprolol tartrate (Lopressor) 100 mg tablet oral, 2 times daily    nystatin (Mycostatin) 100,000 unit/gram powder APPLY 2 OR 3 TIMES A DAY TO AFFECTED AREA    omeprazole (PriLOSEC) 20 mg DR capsule oral    polyethylene glycol (GLYCOLAX, MIRALAX) 17 g, oral    potassium chloride CR (K-Tab) 20 mEq ER tablet 1 tablet, oral, Daily    spironolactone (Aldactone) 25 mg tablet 1 tablet, oral, Daily    tamsulosin (Flomax) 0.4 mg 24 hr capsule oral    True Metrix Air Glucose Meter kit     TRUEplus Lancets 33 gauge misc        Physical Exam     Appearance: Alert, oriented , cooperative,  in no acute distress.  Morbidly obese and bedridden.     Skin: Intact,  dry skin, no petechiae or purpura.  Patient does have small raised white bumps over dorsal aspect of bilateral feet as well as hips.  Small nodules are nontender and they are not fluctuant.  No significant redness or warmth surrounding them.     Eyes: PERRLA, EOMs intact,  Conjunctiva pink with no redness or exudates.      ENT: Hearing grossly intact.      Neck: Supple. Trachea at midline.      Pulmonary: Clear bilaterally. No rales, rhonchi or wheezing. No accessory muscle use or stridor.  Difficult to fully hear due to body habitus but no acute abnormalities.     Cardiac: Normal S1, S2 without murmur, rub, gallop or extrasystole.      Abdomen: Soft, active bowel sounds.  No " palpable organomegaly.  Suprapubic tenderness with no rebound or guarding.  No CVA tenderness.     Genitourinary: Exam deferred.     Musculoskeletal: Full range of motion. no pain, edema, or deformity.      Neurological:  Cranial nerves II through XII are grossly intact, finger-nose touch is normal, normal sensation, no weakness, no focal findings identified.     Psychiatric: Appropriate mood and affect.      Results     Labs Reviewed   CBC WITH AUTO DIFFERENTIAL - Abnormal       Result Value    WBC 7.5      nRBC 0.0      RBC 4.62      Hemoglobin 12.4 (*)     Hematocrit 40.7 (*)     MCV 88      MCH 26.8      MCHC 30.5 (*)     RDW 14.6 (*)     Platelets 257      MPV 9.9      Neutrophils % 68.5      Immature Granulocytes %, Automated 0.9      Lymphocytes % 22.5      Monocytes % 5.7      Eosinophils % 1.9      Basophils % 0.5      Neutrophils Absolute 5.14      Immature Granulocytes Absolute, Automated 0.07      Lymphocytes Absolute 1.69      Monocytes Absolute 0.43      Eosinophils Absolute 0.14      Basophils Absolute 0.04     COMPREHENSIVE METABOLIC PANEL - Abnormal    Glucose 78      Sodium 141      Potassium 4.3      Chloride 101      Bicarbonate 35 (*)     Anion Gap 9 (*)     Urea Nitrogen 27 (*)     Creatinine 0.98      eGFR 82      Calcium 9.4      Albumin 3.5      Alkaline Phosphatase 64      Total Protein 6.1 (*)     AST 24      Bilirubin, Total 0.4      ALT 17     URINALYSIS WITH REFLEX MICROSCOPIC - Abnormal    Color, Urine Yolanda (*)     Appearance, Urine Hazy (*)     Specific Gravity, Urine 1.018      pH, Urine 5.0      Protein, Urine NEGATIVE      Glucose, Urine NEGATIVE      Blood, Urine NEGATIVE      Ketones, Urine NEGATIVE      Bilirubin, Urine NEGATIVE      Urobilinogen, Urine 4.0 (*)     Nitrite, Urine POSITIVE (*)     Leukocyte Esterase, Urine SMALL (1+) (*)    URINALYSIS MICROSCOPIC ONLY - Abnormal    WBC, Urine >50 (*)     WBC Clumps, Urine MANY      RBC, Urine 1-2      Bacteria, Urine 4+ (*)      "Mucus, Urine 1+       No orders to display         ED Course & Medical Decision Making     Medications   levoFLOXacin (Levaquin) tablet 750 mg (has no administration in time range)     Heart Rate:  [64]   Temp:  [36 °C (96.8 °F)]   Resp:  [16]   BP: (111)/(56)   Height:  [195.6 cm (6' 5\")]   Weight:  [163 kg (360 lb 3.7 oz)]   SpO2:  [97 %]    ED Course as of 10/06/23 1840   Fri Oct 06, 2023   1829 Urinalysis with Reflex Microscopic(!)  Reviewed prior culture data.  Patient previously grew out Enterobacter susceptible to fluoroquinolones.  Based on his allergies, this may be the best antimicrobial.  We will have pharmacy review. [AB]      ED Course User Index  [AB] Umberto Rios MD         Diagnoses as of 10/06/23 1840   Acute cystitis without hematuria     1835  73-year-old male presents today with chief complaint of difficulty urinating.  Patient also had a complaint of a rash.  The rashes not appear to be specific for anything and just has some white raised papules that do not appear to be specific so no treatment will be needed for that.  Patient urinalysis today did show that he had a urinary tract infection with a known history of BPH.  Patient's kidney function was maintained.  Plan of care discussed with the patient and family.  Patient is bedridden so he was offered catheter placement which he declined stating that he has a external catheter at home that he can use.  I informed him that if he is unable to urinate that he might end up back here with retention and he still declined catheter placement at this time.  Patient will be given a dose of p.o. Levaquin and be sent home with a additional 7 days of Levaquin.  Patient is in agreement with the plan of care.  He was urged to follow-up with primary care provider 1 to 2 days return here immediately with any worsening symptoms.  Patient seen and evaluated by attending physician who agrees with plan of care.  Patient will be given urology's number for " follow-up.    Procedures   Procedures    Diagnosis     1. Acute cystitis without hematuria        Disposition   Discharged    ED Prescriptions       Medication Sig Dispense Start Date End Date Auth. Provider    levoFLOXacin (Levaquin) 250 mg tablet Take 3 tablets (750 mg) by mouth once daily for 7 days. 21 tablet 10/6/2023 10/13/2023 MAT Moreno PA-C  10/06/23 6124

## 2023-10-30 NOTE — ED TRIAGE NOTES
PT PRESENT TO ED VIA EMS FROM HOME FOR BLOODY EMESIS. PT STATES HE BEGAN TO VOMIT LAST NIGHT AFTER DINNER. VOMITING CONTINUED LAST NIGHT INTO THIS AM. PT STATES EMESIS WAS DARK AND ALMOST BLACK. PT DENIES ABD PAIN. PT TAKES ELIQUIS FOR A FIB.

## 2023-10-30 NOTE — ED PROVIDER NOTES
HPI   Chief Complaint   Patient presents with    Vomiting Blood       Patient is a 73-year-old male with atrial fibrillation on Eliquis, hypertension, hyperlipidemia, diabetes, CKD stage III, hypothyroidism, chronic respiratory failure on 3 L nasal cannula baseline, obstructive sleep apnea who presents to the emergency department for vomiting blood.  He started having black, liquid emesis starting last night.  In addition to the black liquid, he had vomited up his dinner.  He continued to have several episodes of vomiting overnight so he presents emergency department for evaluation.  His last vomit was approximately 90 minutes prior to ED arrival.  His nausea has resolved.  When his symptoms began last night, he did have some left lower quadrant abdominal pain that has since resolved.  His last bowel movement was 2 days ago.  He reports that this is normal for him.  He denies any NSAID use or history of GI bleed.  Denies any history of alcohol abuse.  Denies any chest pain, shortness of breath, fevers.      History provided by:  Patient and medical records                      [place] Coma Scale Score: 15                  Patient History   Past Medical History:   Diagnosis Date    Other hyperlipidemia     Other hyperlipidemia    Overweight     Overweight (BMI 25.0-29.9)    Personal history of other diseases of the nervous system and sense organs     History of sleep apnea    Personal history of other diseases of urinary system     History of renal insufficiency syndrome    Personal history of other endocrine, nutritional and metabolic disease     History of hyperlipidemia    Personal history of other endocrine, nutritional and metabolic disease     History of type 2 diabetes mellitus    Pure hyperglyceridemia     Hypertriglyceridemia    Unspecified osteoarthritis, unspecified site     Degenerative joint disease     Past Surgical History:   Procedure Laterality Date    APPENDECTOMY  01/22/2018    Appendectomy     OTHER SURGICAL HISTORY  10/19/2021    Cardiac catheterization    TONSILLECTOMY  01/22/2018    Tonsillectomy     No family history on file.  Social History     Tobacco Use    Smoking status: Not on file    Smokeless tobacco: Not on file   Substance Use Topics    Alcohol use: Not on file    Drug use: Not on file       Physical Exam   ED Triage Vitals [10/30/23 1020]   Temp Heart Rate Resp BP   36.2 °C (97.2 °F) (!) 124 23 113/57      SpO2 Temp Source Heart Rate Source Patient Position   -- Temporal Monitor Lying      BP Location FiO2 (%)     Right arm --       Physical Exam  Vitals and nursing note reviewed.   Constitutional:       General: He is not in acute distress.     Appearance: He is well-developed.   HENT:      Head: Normocephalic and atraumatic.      Right Ear: External ear normal.      Left Ear: External ear normal.      Nose: Nose normal.      Mouth/Throat:      Mouth: Mucous membranes are moist.   Eyes:      Conjunctiva/sclera: Conjunctivae normal.   Cardiovascular:      Rate and Rhythm: Normal rate and regular rhythm.      Heart sounds: No murmur heard.  Pulmonary:      Effort: Pulmonary effort is normal. No respiratory distress.      Breath sounds: Normal breath sounds.   Abdominal:      Palpations: Abdomen is soft.      Tenderness: There is no abdominal tenderness. There is no guarding or rebound.      Comments: Abdomen is obese but nontender.   Musculoskeletal:         General: No swelling.      Cervical back: Neck supple.   Skin:     General: Skin is warm and dry.      Capillary Refill: Capillary refill takes less than 2 seconds.   Neurological:      General: No focal deficit present.      Mental Status: He is alert.   Psychiatric:         Mood and Affect: Mood normal.         ED Course & MDM   Diagnoses as of 10/30/23 1701   Delayed gastric transit       Medical Decision Making  Patient is a 73-year-old male who presents to the emergency department for hematemesis.  On arrival to the emergency  department, he is noted to be tachycardic in the 120s, but otherwise hemodynamically stable.  EKG shows that he is in atrial fibrillation.  He did not take his medications this morning due to the vomiting.  He reported some abdominal pain last night with the symptoms began, but does not have any pain today.  His abdomen is obese, but soft and nontender.  He does have history of prior abdominal surgeries.  We will obtain CT imaging of the abdomen pelvis to rule out obstruction or other acute findings such as abscess, diverticulitis, colitis, or other acute pathology.  We will also obtain lab work to trend his hemoglobin.  Patient is given Zofran 4 mg IV, 1 L IV fluids.    Results for orders placed or performed during the hospital encounter of 10/30/23  -CBC and Auto Differential:        Result                      Value             Ref Range           WBC                         9.8               4.4 - 11.3 x*       nRBC                        0.0               0.0 - 0.0 /1*       RBC                         4.81              4.50 - 5.90 *       Hemoglobin                  13.0 (L)          13.5 - 17.5 *       Hematocrit                  42.7              41.0 - 52.0 %       MCV                         89                80 - 100 fL         MCH                         27.0              26.0 - 34.0 *       MCHC                        30.4 (L)          32.0 - 36.0 *       RDW                         13.9              11.5 - 14.5 %       Platelets                   214               150 - 450 x1*       MPV                         10.1              7.5 - 11.5 fL       Neutrophils %               88.2              40.0 - 80.0 %       Immature Granulocytes *     0.4               0.0 - 0.9 %         Lymphocytes %               5.4               13.0 - 44.0 %       Monocytes %                 5.8               2.0 - 10.0 %        Eosinophils %               0.0               0.0 - 6.0 %         Basophils %                  0.2               0.0 - 2.0 %         Neutrophils Absolute        8.59 (H)          1.60 - 5.50 *       Immature Granulocytes *     0.04              0.00 - 0.50 *       Lymphocytes Absolute        0.53 (L)          0.80 - 3.00 *       Monocytes Absolute          0.57              0.05 - 0.80 *       Eosinophils Absolute        0.00              0.00 - 0.40 *       Basophils Absolute          0.02              0.00 - 0.10 *  -Comprehensive metabolic panel:        Result                      Value             Ref Range           Glucose                     353 (H)           74 - 99 mg/dL       Sodium                      139               136 - 145 mm*       Potassium                   4.6               3.5 - 5.3 mm*       Chloride                    96 (L)            98 - 107 mmo*       Bicarbonate                 30                21 - 32 mmol*       Anion Gap                   18                10 - 20 mmol*       Urea Nitrogen               27 (H)            6 - 23 mg/dL        Creatinine                  1.00              0.50 - 1.30 *       eGFR                        80                >60 mL/min/1*       Calcium                     9.2               8.6 - 10.3 m*       Albumin                     3.6               3.4 - 5.0 g/*       Alkaline Phosphatase        50                33 - 136 U/L        Total Protein               6.4               6.4 - 8.2 g/*       AST                         15                9 - 39 U/L          Bilirubin, Total            0.5               0.0 - 1.2 mg*       ALT                         14                10 - 52 U/L    -Protime-INR:        Result                      Value             Ref Range           Protime                     15.3 (H)          9.8 - 12.8 s*       INR                         1.4 (H)           0.9 - 1.1      -Type and Screen:        Result                      Value             Ref Range           ABO TYPE                    A                                      Rh TYPE                     POS                                   ANTIBODY SCREEN             NEG                                 CT abdomen pelvis w IV contrast   Final Result    1. Gastric distension suspicious for gastric paresis.    2. Pancreatic atrophy.    3. Cholelithiasis.    4. Otherwise unremarkable considering limitations with incomplete    inclusion of the left abdominal wall, no other acute findings.          MACRO:    1. None          Signed by: Shay Greenfield 10/30/2023 3:38 PM    Dictation workstation:   [id number]      Patient's hemoglobin has remained stable.  He has not had any episodes of vomiting here.  CT imaging shows gastric paresis.  Patient is given a dose of IV Reglan here to promote gastric transit.  He has been able to tolerate oral intake.  Once his hemoglobin returned as stable, patient was given IV metoprolol for his tachycardia.  This did resolve and his heart rate is now in the low 100s.  He is stable for discharge but should have outpatient follow-up with GI.  He is given GI follow-up.  He is given a prescription for Reglan and pantoprazole.  Home care and return instructions discussed. Patient expressed understanding and agreement. Patient discharged in stable condition.    Aidan Moss DO, PGY-3  Emergency Medicine Resident    Amount and/or Complexity of Data Reviewed  Labs: ordered.  Radiology: ordered and independent interpretation performed.  ECG/medicine tests: ordered and independent interpretation performed.        Procedure  Procedures     Aidan Moss DO  Resident  10/30/23 1706       Aidan Moss DO  Resident  10/30/23 1707    --------------------------------------  I did evaluate the patient independently from the resident and was present for key medical decision making.  Agree with disposition.  Any discrepancies between residents findings are detailed below.    Personally interpreted the EKG as below.  ECG revealed atrial fibrillation with RVR at a rate of 124  beats per minute with AR interval * , QRS of 82 , QTc of 494. No appreciable ST elevations or depressions.  Comparatively to previous EKG 4/26/2022 there is no significant change.    CT imaging did show delayed gastric transit was given Reglan was able to tolerate p.o.  Low concern for bowel obstruction.  Also have a very low concern for GI bleed as patient's hemoglobin is significantly improved he has had no bouts of emesis while in the emergency department.  Heart rate was controlled after giving 2 doses of metoprolol I suspect A-fib RVR was caused by patient's inability to tolerate metoprolol at home.  He was offered admission to the hospital although he is feeling improved tolerating p.o. he would like to go home.  Patient was given GI to follow-up with and subsequently discharged in stable condition with strict return precautions.    DO Adams Rod DO  10/30/23 3524

## 2023-10-30 NOTE — PROGRESS NOTES
Pharmacy Medication History Review    Ignacio Hatch is a 73 y.o. male admitted for No Principal Problem: There is no principal problem currently on the Problem List. Please update the Problem List and refresh.. Pharmacy reviewed the patient's [id number] medications and allergies for accuracy.    The list below reflectives the updated PTA list. Please review each medication in order reconciliation for additional clarification and justification.  (Not in a hospital admission)       The list below reflectives the updated allergy list. Please review each documented allergy for additional clarification and justification.  Allergies  Reviewed by Sherry Donahue CPhT on 10/30/2023        Severity Reactions Comments    Cephalosporins High Anaphylaxis     Penicillins Medium Other Childhood allergy - turned blue    Azithromycin Low Swelling, Rash     Clarithromycin Low Swelling, Rash     Erythromycin Low Swelling, Rash     Hydrocodone-acetaminophen Low Swelling, Rash     Neomycin Low Rash     Sulfa (sulfonamide Antibiotics) Low Swelling, Rash     Tetracycline Low Swelling, Rash             Below are additional concerns with the patient's PTA list.    See PTA med list    Sherry Donahue CPhT

## 2023-11-01 NOTE — ED PROVIDER NOTES
HPI   No chief complaint on file.      Patient is a 73-year-old male with atrial fibrillation on Eliquis, hypertension, hyperlipidemia, diabetes, CKD stage III, hypothyroidism, chronic respiratory failure on 3 L nasal cannula baseline, obstructive sleep apnea who presents to the emergency department in full arrest.  Patient comes from home.  EMS was called to the home for altered mental status.  After they had arrived and they were evaluating the patient, he went into PEA.  They were able to obtain ROSC.  They attempted to intubate twice but were unable to obtain an airway so a Sanya airway was placed.  Patient arrives to the emergency department with a pulse, but unresponsive.  Significant mount of secretions and vomit were noted in his airway and around his face.       History provided by:  EMS personnel and medical records                      No data recorded                Patient History   Past Medical History:   Diagnosis Date    Other hyperlipidemia     Other hyperlipidemia    Overweight     Overweight (BMI 25.0-29.9)    Personal history of other diseases of the nervous system and sense organs     History of sleep apnea    Personal history of other diseases of urinary system     History of renal insufficiency syndrome    Personal history of other endocrine, nutritional and metabolic disease     History of hyperlipidemia    Personal history of other endocrine, nutritional and metabolic disease     History of type 2 diabetes mellitus    Pure hyperglyceridemia     Hypertriglyceridemia    Unspecified osteoarthritis, unspecified site     Degenerative joint disease     Past Surgical History:   Procedure Laterality Date    APPENDECTOMY  01/22/2018    Appendectomy    OTHER SURGICAL HISTORY  10/19/2021    Cardiac catheterization    TONSILLECTOMY  01/22/2018    Tonsillectomy     No family history on file.  Social History     Tobacco Use    Smoking status: Not on file    Smokeless tobacco: Not on file   Substance Use  Topics    Alcohol use: Not on file    Drug use: Not on file       Physical Exam   ED Triage Vitals   Temp Pulse Resp BP   -- -- -- --      SpO2 Temp src Heart Rate Source Patient Position   -- -- -- --      BP Location FiO2 (%)     -- --       Physical Exam  Constitutional:       Comments: Obtunded, unresponsive.   HENT:      Head: Normocephalic and atraumatic.      Right Ear: External ear normal.      Left Ear: External ear normal.      Mouth/Throat:      Comments: Significant amount of black, liquid material is noted in the airway.  Eyes:      Comments: Pupils dilated at approximately 5 mm, not reactive to light.  Equal in size.   Cardiovascular:      Rate and Rhythm: Tachycardia present. Rhythm irregular.   Pulmonary:      Breath sounds: Rhonchi present.      Comments: Apneic.  Abdominal:      Palpations: Abdomen is soft.      Tenderness: There is no guarding or rebound.   Musculoskeletal:         General: No deformity.      Cervical back: Neck supple. No rigidity.   Skin:     General: Skin is dry.   Neurological:      Comments: GCS 3.         ED Course & MDM   Diagnoses as of 11/01/23 0735   Cardiorespiratory arrest (CMS/[facility])       Medical Decision Making  Patient is a 73-year-old male who presents emergency department for cardiac arrest.  Patient had PEA after being called for altered mental status.  ROSC had been achieved in the field, but definitive airway could not be obtained and patient arrives with a Sanya airway in place.  Pulses present, but patient is apneic and breaths are supplied via Sanya airway and bag.  Significant amount of black, liquid secretions, likely vomit, are present in the airway and around his face.  Suspect respiratory arrest secondary to aspiration.  Airway is prioritized and intubation with video-assisted hyper angulated blade is attempted.  Grade 1 view of the vocal cords were visualized, but unable to pass the ET tube through the cords.  During the attempt, patient's advanced  directive is found, stating that he would not want prolonged resuscitation.  Further attempts at intubation were stopped and patient was bagged instead.  ED attending spoke with the patient's wife, and it was confirmed that the patient would not want to be intubated or have further resuscitation efforts continued.  Resuscitation stopped and patient was made comfortable with adequate suctioning of the airway.  Patient's wife was updated.  Patient passed away soon afterwards.  Patient was no longer breathing, no carotid or femoral pulses were palpable.  Time of death 0532 on 11/1/2023.    Aidan Moss DO, PGY 3  Emergency Medicine Resident        Procedure  Procedures     Aidan Moss DO  Resident  11/01/23 0726       Aidan Moss DO  Resident  11/01/23 0735

## 2023-12-22 ENCOUNTER — HOSPITAL ENCOUNTER (OUTPATIENT)
Dept: CARDIOLOGY | Facility: HOSPITAL | Age: 73
Discharge: HOME | End: 2023-12-22

## 2023-12-22 LAB
Q ONSET: 219 MS
QRS COUNT: 21 BEATS
QRS DURATION: 82 MS
QT INTERVAL: 344 MS
QTC CALCULATION(BAZETT): 494 MS
QTC FREDERICIA: 438 MS
R AXIS: 66 DEGREES
T AXIS: -30 DEGREES
T OFFSET: 391 MS
VENTRICULAR RATE: 124 BPM

## 2023-12-22 PROCEDURE — 93005 ELECTROCARDIOGRAM TRACING: CPT
